# Patient Record
Sex: MALE | Race: WHITE
[De-identification: names, ages, dates, MRNs, and addresses within clinical notes are randomized per-mention and may not be internally consistent; named-entity substitution may affect disease eponyms.]

---

## 2018-08-16 ENCOUNTER — HOSPITAL ENCOUNTER (OUTPATIENT)
Dept: HOSPITAL 47 - RADCTMAIN | Age: 43
Discharge: HOME | End: 2018-08-16
Attending: PHYSICIAN ASSISTANT
Payer: COMMERCIAL

## 2018-08-16 ENCOUNTER — HOSPITAL ENCOUNTER (OUTPATIENT)
Dept: HOSPITAL 47 - RADUSWWP | Age: 43
Discharge: HOME | End: 2018-08-16
Attending: FAMILY MEDICINE
Payer: COMMERCIAL

## 2018-08-16 DIAGNOSIS — K11.1: Primary | ICD-10-CM

## 2018-08-16 DIAGNOSIS — K11.20: Primary | ICD-10-CM

## 2018-08-16 PROCEDURE — 76536 US EXAM OF HEAD AND NECK: CPT

## 2018-08-16 PROCEDURE — 70491 CT SOFT TISSUE NECK W/DYE: CPT

## 2018-08-16 NOTE — CT
EXAMINATION TYPE: CT soft tissue neck w con

 

DATE OF EXAM: 8/16/2018

 

COMPARISON: none

 

HISTORY: Localized swelling, mass, lump of neck

 

CT DLP: 601 mGycm

 

CONTRAST: 

CT scan of the neck is performed with IV Contrast, patient injected with 100 ml mL of Isovue 300.

 

Contrast enhanced CT of the neck was performed from the skull base through the lung apices.

 

AIRWAY:   The supraglottic, glottic, and subglottic portions of the airway appear patent and free of 
mass.

 

SALIVARY GLANDS: Edema of the right submandibular gland and glandular enlargement and periglandular s
tranding. The right submandibular gland measures 4.3 cm x 3.3 x 3.0 cm. No obstructing calculus is id
entified at this time. Left submandibular gland and parotid glands are within normal limits.

 

THYROID GLAND:  No nodules or masses seen.

 

LYMPH NODES: No adenopathy seen greater than 1cm.

 

LUNG APICES:  No nodule or mass is seen.

 

OTHER:  Vascular structures are patent.  No significant degenerative change of the cervical spine.  N
o abscess seen.

 

IMPRESSION:

Sialoadenitis of the right submandibular gland without obstructing calculus.

## 2018-08-16 NOTE — US
EXAMINATION TYPE: US thyroid st tissue head/neck

 

DATE OF EXAM: 8/16/2018

 

COMPARISON: NONE

 

CLINICAL HISTORY: R22.1 SWELLING MASS LUMP IN NECK. Right neck/ submandibular palpable lump since las
t night

 

Area of palpable lump scanned.  Submandibular gland appears enlarged and vascular = 3.5 x 3.8 x 2.4 c
m.  Contralateral images taken.  

 

No other discrete abnormalities. No enlarged lymph nodes.

 

IMPRESSION:  Hypervascular and asymmetrically enlarged right submandibular gland compatible with acut
e sialoadenitis. CT could be performed for obstructing ductal calculus/sialolith.

## 2019-10-30 ENCOUNTER — HOSPITAL ENCOUNTER (OUTPATIENT)
Dept: HOSPITAL 47 - LABWHC1 | Age: 44
Discharge: HOME | End: 2019-10-30
Attending: ORTHOPAEDIC SURGERY
Payer: COMMERCIAL

## 2019-10-30 DIAGNOSIS — M06.9: ICD-10-CM

## 2019-10-30 DIAGNOSIS — M79.671: ICD-10-CM

## 2019-10-30 DIAGNOSIS — M25.471: ICD-10-CM

## 2019-10-30 DIAGNOSIS — M25.571: Primary | ICD-10-CM

## 2019-10-30 LAB
BASOPHILS # BLD AUTO: 0.1 K/UL (ref 0–0.2)
BASOPHILS NFR BLD AUTO: 1 %
DEPRECATED POLYS # FLD: 89 %
EOSINOPHIL # BLD AUTO: 0.3 K/UL (ref 0–0.7)
EOSINOPHIL NFR BLD AUTO: 3 %
ERYTHROCYTE [DISTWIDTH] IN BLOOD BY AUTOMATED COUNT: 5.44 M/UL (ref 4.3–5.9)
ERYTHROCYTE [DISTWIDTH] IN BLOOD: 13.1 % (ref 11.5–15.5)
HCT VFR BLD AUTO: 48.7 % (ref 39–53)
HGB BLD-MCNC: 16.1 GM/DL (ref 13–17.5)
LYMPHOCYTES # SPEC AUTO: 2.4 K/UL (ref 1–4.8)
LYMPHOCYTES NFR SPEC AUTO: 28 %
MCH RBC QN AUTO: 29.6 PG (ref 25–35)
MCHC RBC AUTO-ENTMCNC: 33.1 G/DL (ref 31–37)
MCV RBC AUTO: 89.4 FL (ref 80–100)
MONOCYTES # BLD AUTO: 0.5 K/UL (ref 0–1)
MONOCYTES NFR BLD AUTO: 6 %
MONONUC CELLS # FLD: 11 %
NEUTROPHILS # BLD AUTO: 5.1 K/UL (ref 1.3–7.7)
NEUTROPHILS NFR BLD AUTO: 60 %
NUC CELL # FLD: (no result) /UL
PLATELET # BLD AUTO: 255 K/UL (ref 150–450)
WBC # BLD AUTO: 8.5 K/UL (ref 3.8–10.6)

## 2019-10-30 PROCEDURE — 86431 RHEUMATOID FACTOR QUANT: CPT

## 2019-10-30 PROCEDURE — 84443 ASSAY THYROID STIM HORMONE: CPT

## 2019-10-30 PROCEDURE — 87075 CULTR BACTERIA EXCEPT BLOOD: CPT

## 2019-10-30 PROCEDURE — 89050 BODY FLUID CELL COUNT: CPT

## 2019-10-30 PROCEDURE — 86812 HLA TYPING A B OR C: CPT

## 2019-10-30 PROCEDURE — 86618 LYME DISEASE ANTIBODY: CPT

## 2019-10-30 PROCEDURE — 86140 C-REACTIVE PROTEIN: CPT

## 2019-10-30 PROCEDURE — 85652 RBC SED RATE AUTOMATED: CPT

## 2019-10-30 PROCEDURE — 86060 ANTISTREPTOLYSIN O TITER: CPT

## 2019-10-30 PROCEDURE — 87070 CULTURE OTHR SPECIMN AEROBIC: CPT

## 2019-10-30 PROCEDURE — 89060 EXAM SYNOVIAL FLUID CRYSTALS: CPT

## 2019-10-30 PROCEDURE — 86038 ANTINUCLEAR ANTIBODIES: CPT

## 2019-10-30 PROCEDURE — 85025 COMPLETE CBC W/AUTO DIFF WBC: CPT

## 2019-10-30 PROCEDURE — 87205 SMEAR GRAM STAIN: CPT

## 2019-10-30 PROCEDURE — 36415 COLL VENOUS BLD VENIPUNCTURE: CPT

## 2019-10-31 LAB
ASO AB SER QL: 156 IU/ML (ref 0–200)
HLA-B27 QL: NEGATIVE
RHEUMATOID FACT SERPL-ACNC: <4 IU/ML (ref 0–15)

## 2024-11-21 ENCOUNTER — HOSPITAL ENCOUNTER (OUTPATIENT)
Dept: HOSPITAL 47 - LABPRL | Age: 49
Discharge: HOME | End: 2024-11-21
Attending: ORTHOPAEDIC SURGERY
Payer: COMMERCIAL

## 2024-11-21 DIAGNOSIS — M25.59: Primary | ICD-10-CM

## 2024-11-21 LAB
CELL CNT PNL FLD: 100
DEPRECATED POLYS # FLD: 48 %
MONONUC CELLS # FLD: 52 %
NUC CELL # FLD: 3625 /UL

## 2024-11-21 PROCEDURE — 89060 EXAM SYNOVIAL FLUID CRYSTALS: CPT

## 2024-11-21 PROCEDURE — 89050 BODY FLUID CELL COUNT: CPT

## 2024-11-22 ENCOUNTER — HOSPITAL ENCOUNTER (OUTPATIENT)
Dept: HOSPITAL 47 - LABWHC1 | Age: 49
Discharge: HOME | End: 2024-11-22
Attending: ORTHOPAEDIC SURGERY
Payer: COMMERCIAL

## 2024-11-22 DIAGNOSIS — M79.662: Primary | ICD-10-CM

## 2024-11-22 LAB — URATE SERPL-MCNC: 8.6 MG/DL (ref 3.7–8.7)

## 2024-11-22 PROCEDURE — 85652 RBC SED RATE AUTOMATED: CPT

## 2024-11-22 PROCEDURE — 84550 ASSAY OF BLOOD/URIC ACID: CPT

## 2024-11-22 PROCEDURE — 36415 COLL VENOUS BLD VENIPUNCTURE: CPT

## 2024-11-22 PROCEDURE — 86140 C-REACTIVE PROTEIN: CPT
